# Patient Record
Sex: MALE | Race: WHITE | NOT HISPANIC OR LATINO | URBAN - METROPOLITAN AREA
[De-identification: names, ages, dates, MRNs, and addresses within clinical notes are randomized per-mention and may not be internally consistent; named-entity substitution may affect disease eponyms.]

---

## 2019-10-16 ENCOUNTER — OFFICE VISIT (OUTPATIENT)
Dept: PULMONOLOGY | Facility: HOSPICE | Age: 59
End: 2019-10-16
Payer: COMMERCIAL

## 2019-10-16 ENCOUNTER — APPOINTMENT (OUTPATIENT)
Dept: RADIOLOGY | Facility: IMAGING CENTER | Age: 59
End: 2019-10-16
Attending: INTERNAL MEDICINE
Payer: COMMERCIAL

## 2019-10-16 VITALS
BODY MASS INDEX: 28.14 KG/M2 | SYSTOLIC BLOOD PRESSURE: 136 MMHG | DIASTOLIC BLOOD PRESSURE: 84 MMHG | TEMPERATURE: 98.1 F | RESPIRATION RATE: 16 BRPM | WEIGHT: 190 LBS | HEIGHT: 69 IN | HEART RATE: 77 BPM | OXYGEN SATURATION: 95 %

## 2019-10-16 DIAGNOSIS — R05.9 COUGH: ICD-10-CM

## 2019-10-16 PROCEDURE — 99204 OFFICE O/P NEW MOD 45 MIN: CPT | Performed by: INTERNAL MEDICINE

## 2019-10-16 PROCEDURE — 71046 X-RAY EXAM CHEST 2 VIEWS: CPT | Mod: TC | Performed by: INTERNAL MEDICINE

## 2019-10-16 RX ORDER — LISINOPRIL 20 MG/1
TABLET ORAL
COMMUNITY
Start: 2019-10-15 | End: 2023-11-28

## 2019-10-16 RX ORDER — FLUTICASONE PROPIONATE AND SALMETEROL XINAFOATE 230; 21 UG/1; UG/1
2 AEROSOL, METERED RESPIRATORY (INHALATION) 2 TIMES DAILY
Qty: 1 INHALER | Refills: 0 | Status: SHIPPED | OUTPATIENT
Start: 2019-10-16 | End: 2019-11-21

## 2019-10-16 RX ORDER — ALBUTEROL SULFATE 90 UG/1
2 AEROSOL, METERED RESPIRATORY (INHALATION)
COMMUNITY
Start: 2019-05-17 | End: 2023-12-11

## 2019-10-16 RX ORDER — GLYBURIDE 2.5 MG/1
2.5 TABLET ORAL
COMMUNITY
Start: 2019-01-17 | End: 2020-01-17

## 2019-10-16 RX ORDER — MONTELUKAST SODIUM 10 MG/1
10 TABLET ORAL
COMMUNITY
Start: 2018-04-12 | End: 2023-10-18

## 2019-10-16 RX ORDER — PREDNISONE 10 MG/1
TABLET ORAL
Qty: 18 TAB | Refills: 0 | Status: SHIPPED | OUTPATIENT
Start: 2019-10-16 | End: 2023-10-18

## 2019-10-16 SDOH — HEALTH STABILITY: MENTAL HEALTH: HOW MANY STANDARD DRINKS CONTAINING ALCOHOL DO YOU HAVE ON A TYPICAL DAY?: 3 OR 4

## 2019-10-16 SDOH — HEALTH STABILITY: MENTAL HEALTH: HOW OFTEN DO YOU HAVE A DRINK CONTAINING ALCOHOL?: MONTHLY OR LESS

## 2019-10-16 ASSESSMENT — ENCOUNTER SYMPTOMS
HEMOPTYSIS: 0
PND: 0
WHEEZING: 1
FEVER: 0
FALLS: 0
COUGH: 1
DIARRHEA: 0
ORTHOPNEA: 0
DIZZINESS: 0
NAUSEA: 0
FOCAL WEAKNESS: 0
HEADACHES: 0
VOMITING: 0
DOUBLE VISION: 0
CONSTIPATION: 0
EYE REDNESS: 0
SINUS PAIN: 0
EYE DISCHARGE: 0
WEAKNESS: 0
SHORTNESS OF BREATH: 0
EYE PAIN: 0
CHILLS: 0
SORE THROAT: 0
MYALGIAS: 0
BLURRED VISION: 0
SPUTUM PRODUCTION: 0
SPEECH CHANGE: 0
PHOTOPHOBIA: 0
DIAPHORESIS: 0
STRIDOR: 0
BACK PAIN: 0
WEIGHT LOSS: 0
DEPRESSION: 0
PALPITATIONS: 0
NECK PAIN: 0
ABDOMINAL PAIN: 0
TREMORS: 0
CLAUDICATION: 0
HEARTBURN: 0

## 2019-10-16 NOTE — PROGRESS NOTES
Chief Complaint   Patient presents with   • Establish Care     referral [facility] medical DX asthma with acute exacerbation.        HPI: This patient is a 58 y.o. male presenting for evaluation of episodic cough.  The patient's past medical history significant for diabetes mellitus on oral medication, hypertension on medication.  He is a former smoker with less than 5-pack-year history and quit in 1996.  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  Family history is negative for atopic or pulmonary disease.  With regards to the patient's pulmonary symptoms, he reports cough present for the past 5 years.  The cough occurs in discrete episodes 1-3 times per year typically in the winter during which he experiences wheezing, cough and chest tightness.  This has been associated with reduced oxygen saturation on home oximetry in the past.  Each time he is typically treated with steroid injection followed by prednisone taper with complete resolution of his symptoms in between episodes.  He has been put on the Singulair as well as short acting bronchodilators on an as-needed basis in the past.  Roughly 1 year ago he was given generic Advair at a dose of 500 but not instructed to use daily and has been using this on an as-needed basis.  His last episode requiring steroids was in December of last year.  He presented to his primary care provider recently with the beginnings of chest tightness but not yet requiring steroids and given elevated blood sugars on steroids he was concerned and referred to us.  The patient denies shortness of breath or wheezing in between his episodes.  He does have some other atopic symptoms including watery eyes and occasional nasal congestion.  Symptoms improved without antibiotics typically.  He previously required annual  spirometry for work in the mines but tells me they have not required this for the past 5 years or more.  He denies fevers, chills, night sweats, weight loss.  No chest pain.    History reviewed. No pertinent past medical history.    Social History     Socioeconomic History   • Marital status:      Spouse name: Not on file   • Number of children: Not on file   • Years of education: Not on file   • Highest education level: Not on file   Occupational History   • Not on file   Social Needs   • Financial resource strain: Not on file   • Food insecurity:     Worry: Not on file     Inability: Not on file   • Transportation needs:     Medical: Not on file     Non-medical: Not on file   Tobacco Use   • Smoking status: Former Smoker     Packs/day: 1.00     Years: 3.00     Pack years: 3.00     Types: Cigarettes     Last attempt to quit:      Years since quittin.8   • Smokeless tobacco: Former User     Types: Chew     Quit date: 10/16/2016   Substance and Sexual Activity   • Alcohol use: Yes     Alcohol/week: 4.8 oz     Types: 4 Shots of liquor, 4 Standard drinks or equivalent per week     Frequency: Monthly or less     Drinks per session: 3 or 4   • Drug use: Not on file   • Sexual activity: Not on file   Lifestyle   • Physical activity:     Days per week: Not on file     Minutes per session: Not on file   • Stress: Not on file   Relationships   • Social connections:     Talks on phone: Not on file     Gets together: Not on file     Attends [employer] service: Not on file     Active member of club or organization: Not on file     Attends meetings of clubs or organizations: Not on file     Relationship status: Not on file   • Intimate partner violence:     Fear of current or ex partner: Not on file     Emotionally abused: Not on file     Physically abused: Not on file     Forced sexual activity: Not on file   Other Topics Concern   • Not on file   Social History Narrative   • Not on file       History reviewed.  "No pertinent family history.    Current Outpatient Medications on File Prior to Visit   Medication Sig Dispense Refill   • montelukast (SINGULAIR) 10 MG Tab Take 10 mg by mouth.     • glyBURIDE (DIABETA) 2.5 MG Tab Take 2.5 mg by mouth.     • lisinopril (PRINIVIL) 20 MG Tab      • albuterol 108 (90 Base) MCG/ACT Aero Soln inhalation aerosol Inhale 2 Puffs by mouth.     • TURMERIC PO Take 2 Tabs by mouth every day.       No current facility-administered medications on file prior to visit.        Allergies: Penicillins    ROS:   Review of Systems   Constitutional: Negative for chills, diaphoresis, fever, malaise/fatigue and weight loss.   HENT: Negative for congestion, ear discharge, ear pain, hearing loss, nosebleeds, sinus pain, sore throat and tinnitus.    Eyes: Negative for blurred vision, double vision, photophobia, pain, discharge and redness.   Respiratory: Positive for cough and wheezing. Negative for hemoptysis, sputum production, shortness of breath and stridor.    Cardiovascular: Negative for chest pain, palpitations, orthopnea, claudication, leg swelling and PND.   Gastrointestinal: Negative for abdominal pain, constipation, diarrhea, heartburn, nausea and vomiting.   Genitourinary: Negative for dysuria and urgency.   Musculoskeletal: Negative for back pain, falls, joint pain, myalgias and neck pain.   Skin: Negative for itching and rash.   Neurological: Negative for dizziness, tremors, speech change, focal weakness, weakness and headaches.   Endo/Heme/Allergies: Negative for environmental allergies.   Psychiatric/Behavioral: Negative for depression.       /84 (BP Location: Left arm, Patient Position: Sitting, BP Cuff Size: Adult)   Pulse 77   Temp 36.7 °C (98.1 °F) (Temporal)   Resp 16   Ht 1.753 m (5' 9\")   Wt 86.2 kg (190 lb)   SpO2 95%     Physical Exam:  Physical Exam   Constitutional: He is oriented to person, place, and time. He appears well-developed and well-nourished. No distress. "   HENT:   Head: Normocephalic and atraumatic.   Mouth/Throat: Oropharynx is clear and moist. No oropharyngeal exudate.   Eyes: Pupils are equal, round, and reactive to light. Conjunctivae and EOM are normal. No scleral icterus.   Neck: Normal range of motion. Neck supple. No tracheal deviation present.   Cardiovascular: Normal rate, regular rhythm and normal heart sounds. Exam reveals no gallop and no friction rub.   No murmur heard.  Pulmonary/Chest: Effort normal. No stridor. No respiratory distress.   + inspiratory and expiratory wheezes L mid lung field   Abdominal: Soft. He exhibits no distension.   Musculoskeletal: Normal range of motion. He exhibits no edema or deformity.   Neurological: He is alert and oriented to person, place, and time. No cranial nerve deficit.   Skin: Skin is warm and dry. No rash noted.   Psychiatric: He has a normal mood and affect.       PFTs as reviewed by me personally: none    Imaging as reviewed by me personally: Pending    Assessment:  1. Cough  fluticasone-salmeterol (ADVAIR HFA) 230-21 MCG/ACT inhaler    PULMONARY FUNCTION TESTS -Test requested: Complete Pulmonary Function Test    DX-CHEST-2 VIEWS    predniSONE (DELTASONE) 10 MG Tab       Plan:  History is most consistent with reactive airways disease with recurrent episodes of bronchitis or asthma exacerbation.  Suspect asthma over COPD given no significant tobacco history.  SaO2 is 95% on room air in clinic today.  We will start with regular use of inhaled corticosteroids with high-dose Advair.  I am changing him over to brand name Advair but HFA given this is covered by his insurance company.  We will do the 230 dose 2 puffs twice daily in addition to short acting bronchodilators as needed.  I advised him to continue Singulair until we have good control over his symptoms.  I am also giving him a rescue prescription of prednisone given the distance that he lives and symptom onset just in case his inhaled steroid does not take  effect immediately.  He is at risk for occupational lung disease therefore we will proceed with full pulmonary function test and chest x-ray.  Pending these results we can consider additional imaging if indicated.  We will see him back in 4 weeks to reassess symptoms and review imaging and PFTs.  Return in about 4 weeks (around 11/13/2019) for pfts.

## 2019-11-21 ENCOUNTER — OFFICE VISIT (OUTPATIENT)
Dept: PULMONOLOGY | Facility: HOSPICE | Age: 59
End: 2019-11-21
Payer: COMMERCIAL

## 2019-11-21 VITALS
OXYGEN SATURATION: 97 % | SYSTOLIC BLOOD PRESSURE: 124 MMHG | HEIGHT: 69 IN | BODY MASS INDEX: 28.58 KG/M2 | RESPIRATION RATE: 16 BRPM | HEART RATE: 79 BPM | TEMPERATURE: 97.7 F | DIASTOLIC BLOOD PRESSURE: 90 MMHG | WEIGHT: 193 LBS

## 2019-11-21 DIAGNOSIS — J45.20 MILD INTERMITTENT ASTHMA, UNSPECIFIED WHETHER COMPLICATED: ICD-10-CM

## 2019-11-21 DIAGNOSIS — R05.9 COUGH: ICD-10-CM

## 2019-11-21 PROCEDURE — 99213 OFFICE O/P EST LOW 20 MIN: CPT | Performed by: INTERNAL MEDICINE

## 2019-11-21 RX ORDER — FLUTICASONE PROPIONATE AND SALMETEROL XINAFOATE 230; 21 UG/1; UG/1
2 AEROSOL, METERED RESPIRATORY (INHALATION) 2 TIMES DAILY
Qty: 1 INHALER | Refills: 6 | Status: SHIPPED | OUTPATIENT
Start: 2019-11-21 | End: 2020-08-10

## 2019-11-21 ASSESSMENT — ENCOUNTER SYMPTOMS
SORE THROAT: 0
CHILLS: 0
NECK PAIN: 0
FEVER: 0
WEIGHT LOSS: 0
COUGH: 0
CONSTIPATION: 0
ABDOMINAL PAIN: 0
DIZZINESS: 0
BACK PAIN: 0
FALLS: 0
DIAPHORESIS: 0
DOUBLE VISION: 0
SPUTUM PRODUCTION: 0
VOMITING: 0
FOCAL WEAKNESS: 0
PND: 0
WEAKNESS: 0
NAUSEA: 0
DIARRHEA: 0
DEPRESSION: 0
ORTHOPNEA: 0
TREMORS: 0
SINUS PAIN: 0
PALPITATIONS: 0
MYALGIAS: 0
WHEEZING: 0
HEADACHES: 0
HEMOPTYSIS: 0
BLURRED VISION: 0
EYE PAIN: 0
STRIDOR: 0
SHORTNESS OF BREATH: 0
EYE DISCHARGE: 0
PHOTOPHOBIA: 0
SPEECH CHANGE: 0
HEARTBURN: 0
CLAUDICATION: 0
EYE REDNESS: 0

## 2019-11-21 NOTE — PROGRESS NOTES
Chief Complaint   Patient presents with   • Cough     last seen 10/16/19   • Results     CXR 10/16/19, NO PFT          HPI: This patient is a 58 y.o. male whom is followed in our clinic for cough last seen by me on 10/16/19 for initial consult.   The patient's past medical history significant for diabetes mellitus on oral medication, hypertension on medication.  He is a former smoker with less than 5-pack-year history and quit in 1996.  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  The patient was referred to me in October for evaluation of recurrent episodes of cough occurring 1-3 times per year typically in the winter months during which he experiences wheezing, cough and chest tightness.  Per patient this has been associated with decreased oxygen saturation based on home oximetry in the past.  He typically requires treatment with steroid injection followed by prednisone taper with complete resolution of symptoms in between episodes.  He had been tried on Singulair as well as short acting bronchodilators on an as-needed basis in the past.  He had also been given Advair Diskus at 500 but was not instructed to use on a daily basis.  His last episode requiring steroids was in December 2018 and he was referred to me in October after presenting to his primary care provider with beginnings of chest tightness but not yet severe enough to require steroids.  Given his underlying diabetes his PCP was concerned regarding repeat treatment with steroids.  He had wheezing on exam at our first visit.  We opted to start high-dose Advair HFA and continue short acting bronchodilators on an as-needed basis.  I instructed him on use of both inhalers and particularly the fact that the Advair needs to be taken daily.  We also obtain chest x-ray which showed no acute cardiopulmonary  abnormality.  We had plan to get pulmonary function test but there was a mixup in his receiving appointment confirmation however he presents today with complete resolution of symptoms.  He denies ongoing cough or wheezing.  He is continuing to use the Advair 2 puffs twice daily.  His SaO2 on room air today is 97% and he feels well.  No chest congestion, cough, wheezing.  He is up-to-date on vaccines.    No past medical history on file.    Social History     Socioeconomic History   • Marital status:      Spouse name: Not on file   • Number of children: Not on file   • Years of education: Not on file   • Highest education level: Not on file   Occupational History   • Not on file   Social Needs   • Financial resource strain: Not on file   • Food insecurity:     Worry: Not on file     Inability: Not on file   • Transportation needs:     Medical: Not on file     Non-medical: Not on file   Tobacco Use   • Smoking status: Former Smoker     Packs/day: 1.00     Years: 3.00     Pack years: 3.00     Types: Cigarettes     Last attempt to quit:      Years since quittin.9   • Smokeless tobacco: Former User     Types: Chew     Quit date: 10/16/2016   Substance and Sexual Activity   • Alcohol use: Yes     Alcohol/week: 4.8 oz     Types: 4 Shots of liquor, 4 Standard drinks or equivalent per week     Frequency: Monthly or less     Drinks per session: 3 or 4   • Drug use: Not Currently   • Sexual activity: Not on file   Lifestyle   • Physical activity:     Days per week: Not on file     Minutes per session: Not on file   • Stress: Not on file   Relationships   • Social connections:     Talks on phone: Not on file     Gets together: Not on file     Attends [employer] service: Not on file     Active member of club or organization: Not on file     Attends meetings of clubs or organizations: Not on file     Relationship status: Not on file   • Intimate partner violence:     Fear of current or ex partner: Not on file      Emotionally abused: Not on file     Physically abused: Not on file     Forced sexual activity: Not on file   Other Topics Concern   • Not on file   Social History Narrative   • Not on file       No family history on file.    Current Outpatient Medications on File Prior to Visit   Medication Sig Dispense Refill   • montelukast (SINGULAIR) 10 MG Tab Take 10 mg by mouth.     • glyBURIDE (DIABETA) 2.5 MG Tab Take 2.5 mg by mouth.     • lisinopril (PRINIVIL) 20 MG Tab      • albuterol 108 (90 Base) MCG/ACT Aero Soln inhalation aerosol Inhale 2 Puffs by mouth.     • TURMERIC PO Take 2 Tabs by mouth every day.     • fluticasone-salmeterol (ADVAIR HFA) 230-21 MCG/ACT inhaler Inhale 2 Puffs by mouth 2 times a day. Inhalation with spacer. Rinse mouth after each use. 1 Inhaler 0   • predniSONE (DELTASONE) 10 MG Tab Take 30mg x 3 days, then take 20mg x 3 days, then take 10mg x 3 days, with food, then discontinue. (Patient not taking: Reported on 11/21/2019) 18 Tab 0     No current facility-administered medications on file prior to visit.        Penicillins      ROS:   Review of Systems   Constitutional: Negative for chills, diaphoresis, fever, malaise/fatigue and weight loss.   HENT: Negative for congestion, ear discharge, ear pain, hearing loss, nosebleeds, sinus pain, sore throat and tinnitus.    Eyes: Negative for blurred vision, double vision, photophobia, pain, discharge and redness.   Respiratory: Negative for cough, hemoptysis, sputum production, shortness of breath, wheezing and stridor.    Cardiovascular: Negative for chest pain, palpitations, orthopnea, claudication, leg swelling and PND.   Gastrointestinal: Negative for abdominal pain, constipation, diarrhea, heartburn, nausea and vomiting.   Genitourinary: Negative for dysuria and urgency.   Musculoskeletal: Negative for back pain, falls, joint pain, myalgias and neck pain.   Skin: Negative for itching and rash.   Neurological: Negative for dizziness, tremors,  "speech change, focal weakness, weakness and headaches.   Endo/Heme/Allergies: Negative for environmental allergies.   Psychiatric/Behavioral: Negative for depression.       /90 (BP Location: Left arm, Patient Position: Sitting, BP Cuff Size: Adult)   Pulse 79   Temp 36.5 °C (97.7 °F) (Temporal)   Resp 16   Ht 1.753 m (5' 9\")   Wt 87.5 kg (193 lb)   SpO2 97%   Physical Exam  Constitutional:       General: He is not in acute distress.     Appearance: Normal appearance. He is normal weight.   HENT:      Head: Normocephalic and atraumatic.      Right Ear: External ear normal.      Left Ear: External ear normal.      Nose: Nose normal. No congestion.      Mouth/Throat:      Mouth: Mucous membranes are moist.      Pharynx: Oropharynx is clear. No oropharyngeal exudate.   Eyes:      General: No scleral icterus.     Extraocular Movements: Extraocular movements intact.      Conjunctiva/sclera: Conjunctivae normal.      Pupils: Pupils are equal, round, and reactive to light.   Neck:      Musculoskeletal: Normal range of motion and neck supple.   Cardiovascular:      Rate and Rhythm: Normal rate and regular rhythm.      Heart sounds: Normal heart sounds. No murmur. No gallop.    Pulmonary:      Effort: Pulmonary effort is normal. No respiratory distress.      Breath sounds: Normal breath sounds. No wheezing or rales.   Abdominal:      General: Abdomen is flat. There is no distension.   Musculoskeletal: Normal range of motion.      Right lower leg: No edema.      Left lower leg: No edema.   Skin:     General: Skin is warm and dry.      Findings: No rash.   Neurological:      Mental Status: He is alert and oriented to person, place, and time.      Cranial Nerves: No cranial nerve deficit.   Psychiatric:         Mood and Affect: Mood normal.         Behavior: Behavior normal.         Imaging as reviewed by me personally:  As per hPI    Assessment:  1. Mild intermittent asthma, unspecified whether complicated  " Spirometry       Plan:  Patient presented with recurrent episodes of cough and wheezing suggestive of reactive airways disease.  He has responded well to inhaled corticosteroid and long-acting beta agonist.  Given that he is currently asymptomatic and we are still in the beginning of winter/early fall, we will continue high-dose Advair and plan to see him back in the spring at which point we can discuss lowering his inhaled corticosteroid dose if necessary.  In the meantime he does have an emergency prednisone taper and antibiotic should symptoms worsen.  I did advise him to contact me if he develops symptoms despite inhaled corticosteroid treatment.  We can complete work-up with pre-and post spirometry at follow-up in the spring.  Return in about 4 months (around 3/21/2020) for asthma/RAD.

## 2020-03-23 ENCOUNTER — APPOINTMENT (OUTPATIENT)
Dept: PULMONOLOGY | Facility: HOSPICE | Age: 60
End: 2020-03-23
Payer: COMMERCIAL

## 2020-05-12 ENCOUNTER — TELEPHONE (OUTPATIENT)
Dept: PULMONOLOGY | Facility: HOSPICE | Age: 60
End: 2020-05-12

## 2020-05-12 NOTE — TELEPHONE ENCOUNTER
Called patient to reschedule follow up appointment and PFT( sangeetha) patient said he was feeling well and will call clinic when he feels necessary.

## 2020-08-06 ENCOUNTER — TELEPHONE (OUTPATIENT)
Dept: PULMONOLOGY | Facility: HOSPICE | Age: 60
End: 2020-08-06

## 2020-08-06 NOTE — TELEPHONE ENCOUNTER
Caller: [facility]    Phone Number: [phone] (home)     Message:  [facility] pharmacy called and said pt's rx was transferred to them.  Pt's rx: Advair HFA has a high co-pay of $75 dollars.  The pharmacy wanted to see if it's okay to change to Diskus Wixela. This would be covered under pt's insurance.       If okay, please send rx electronically

## 2020-08-10 NOTE — TELEPHONE ENCOUNTER
Lashawn Coronado M.D.  You 3 minutes ago (12:20 PM)     I changed the order to high dose, wixella 500. I thought the pharmacy was asking if they could substitute the equivalent dose which they should know but I placed the order.       Thank you.     Called and spoke with pharmacy. Notified pharmacy of this.

## 2020-08-10 NOTE — TELEPHONE ENCOUNTER
Called and spoke with pt. Notified pt I was returning his call and notified him regarding what the pharmacy wanted. Pt understood.     Called and spoke with pharmacy. This medication comes in 3 strengths: 100-50, 250-50 or 500-50. SIG?    Please advise. Thank you

## 2023-10-18 PROBLEM — G56.01 CARPAL TUNNEL SYNDROME ON RIGHT: Status: ACTIVE | Noted: 2023-10-18

## 2023-11-27 PROBLEM — E11.9 TYPE 2 DIABETES MELLITUS, WITHOUT LONG-TERM CURRENT USE OF INSULIN (HCC): Status: ACTIVE | Noted: 2023-11-27

## 2023-11-27 PROBLEM — I10 PRIMARY HYPERTENSION: Status: ACTIVE | Noted: 2023-11-27

## 2023-11-27 PROBLEM — J45.20 MILD INTERMITTENT ASTHMA: Status: ACTIVE | Noted: 2023-11-27

## 2023-12-11 PROBLEM — G56.02 LEFT CARPAL TUNNEL SYNDROME: Status: ACTIVE | Noted: 2023-12-11
